# Patient Record
(demographics unavailable — no encounter records)

---

## 2025-07-30 NOTE — PHYSICAL EXAM
[Right] : right shoulder [Type 2 acromion] : Type 2 acromion [No bony abnormalities] : No bony abnormalities [de-identified] : Constitutional: The general appearance of the patient is well developed, well nourished, no deformities and well groomed. Normal   Gait: Gait and function is as follows: normal gait.   Skin: Head and neck visualized skin is normal. Left upper extremity visualized skin is normal. Right upper extremity visualized skin is normal. Thoracic Skin of the thoracic spine shows visualized skin is normal.   Cardiovascular: palpable radial pulse bilaterally, good capillary refill in digits of the bilateral upper extremities and no temperature or color changes in the bilateral upper extremities.   Lymphatic: Normal Palpation of lymph nodes in the cervical.   Neurologic: fine motor control in the bilateral upper extremities is intact. Deep Tendon Reflexes in Upper and Lower Extremities Negative Carrera's in the bilateral upper extremities. The patient is oriented to time, place and person. Sensation to light touch intact in the bilateral upper extremities. Mood and Affect is normal.   Right Shoulder: Inspection of the shoulder/upper arm is as follows: no scapula winging, no biceps deformity and no AC joint deformity. Palpation of the shoulder/upper arm is as follows: There is tenderness at the proximal biceps tendon. Range of motion of the shoulder is as follows: Pain with internal rotation, external rotation, abduction and forward flexion. Strength of the shoulder is as follows: Supraspinatus 4/5. External Rotation 4/5. Internal Rotation 4/5. Deltoid 5/5 Ligament Stability and Special Tests of the shoulder is as follows: Neer test is positive. Rivera' test is positive. Speed's test is positive   Left Shoulder: Inspection of the shoulder/upper arm is as follows: There is mild pain with range of motion of the shoulder range of motion of the shoulder    Neck:   Inspection / Palpation of the cervical spine is as follows: mild paracervical tenderness. Range of motion of the cervical spine is as follows: moderately decreased range of motion of the cervical spine. Stability testing for the cervical spine is as follows Stable range of motion.   Back, including spine: Inspection / Palpation of the thoracic/lumbar spine is as follows: There is a full, pain free, stable range of motion of the thoracic spine with a normal tone and not tenderness to palpation..

## 2025-07-30 NOTE — PROCEDURE
[FreeTextEntry3] : Large Joint Injection was performed because of pain and inflammation.   Anesthesia: ethyl chloride sprayed topically..   Kenalog: An injection Kenalog 40 mg , 1 cc.   Lidocaine: 3 cc.   Medication was injected in the right bicipital grove. Patient has tried OTC's including aspirin, Ibuprofen, Aleve etc or prescription NSAIDS, and/or exercises at home and/ or physical therapy without satisfactory response and Patient has decreased mobility in the joint. After verbal consent using sterile preparation and technique. The risks, benefits, and alternatives to cortisone injection were explained in full to the patient. Risks outlined include but are not limited to infection, sepsis, bleeding, scarring, skin discoloration, temporary increase in pain, syncopal episode, failure to resolve symptoms, allergic reaction, symptom recurrence, and elevation of blood sugar in diabetics. Patient understood the risks. All questions were answered. After discussion of options, patient requested an injection. Oral informed consent was obtained and sterile prep was done of the injection site. Sterile technique was utilized for the procedure including the preparation of the solutions used for the injection. Patient tolerated the procedure well. Advised to ice the injection site this evening. Prep with alcohol locally to site. Sterile technique used. Patient tolerated procedure well. Post Procedure Instructions: Patient was advised to call if redness, pain, or fever occur and apply ice for 15 min. out of every hour for the next 12-24 hours as tolerated. patient was advised to rest the joint(s) for 7 days.   Ultrasound Guidance was used for the following reasons: prior failure or difficult injection.   Ultrasound guided injection was performed of the shoulder, visualization of the needle and placement of injection was performed without complication.

## 2025-07-30 NOTE — DISCUSSION/SUMMARY
[de-identified] : 68-year-old female presenting with severe right anterior shoulder pain x 2 months.    Patient exam is consistent with long head of biceps tendinitis versus a possible rotator cuff tear that may require surgical intervention I personally reviewed x-ray which are non diagnostic  Maximum point of tenderness on LHBT on exam Advised to take anti inflammatory medicine for pain Pt was treated with an US guided biceps injection for diagnostic and therapeutic purposes  We discussed if transient relief from injection, we would obtain an MRI To determine if surgery would be advised.     Attestation: I, Ashley Sampson , attest that this documentation has been prepared under the direction and in the presence of Provider Wil Jiang MD. The documentation recorded by the scribe, in my presence, accurately reflects the service I personally performed, and the decisions made by me with my edits as appropriate. Wil Jiang MD

## 2025-07-30 NOTE — HISTORY OF PRESENT ILLNESS
[de-identified] : 68-year-old female presenting with severe right anterior shoulder pain x 2 months.  She reports no specific injury or trauma.  Patient states she had sudden onset of severe sharp stabbing pain.  Does admit to intermittent radicular symptoms as well.  Has completed 10 visits of physical therapy with no improvement.  Patient reports pain is worse with abduction and movement away from her body.  Denies any numbness or tingling.